# Patient Record
Sex: FEMALE | Race: NATIVE HAWAIIAN OR OTHER PACIFIC ISLANDER
[De-identification: names, ages, dates, MRNs, and addresses within clinical notes are randomized per-mention and may not be internally consistent; named-entity substitution may affect disease eponyms.]

---

## 2018-10-21 ENCOUNTER — HOSPITAL ENCOUNTER (EMERGENCY)
Dept: HOSPITAL 31 - C.ER | Age: 63
Discharge: HOME | End: 2018-10-21
Payer: COMMERCIAL

## 2018-10-21 VITALS
RESPIRATION RATE: 19 BRPM | OXYGEN SATURATION: 96 % | HEART RATE: 84 BPM | DIASTOLIC BLOOD PRESSURE: 98 MMHG | SYSTOLIC BLOOD PRESSURE: 170 MMHG

## 2018-10-21 VITALS — TEMPERATURE: 97.9 F

## 2018-10-21 VITALS — BODY MASS INDEX: 28 KG/M2

## 2018-10-21 DIAGNOSIS — N39.0: ICD-10-CM

## 2018-10-21 DIAGNOSIS — I10: Primary | ICD-10-CM

## 2018-10-21 LAB
BACTERIA #/AREA URNS HPF: (no result) /[HPF]
BILIRUB UR-MCNC: NEGATIVE MG/DL
GLUCOSE UR STRIP-MCNC: NORMAL MG/DL
LEUKOCYTE ESTERASE UR-ACNC: (no result) LEU/UL
PH UR STRIP: 8 [PH] (ref 5–8)
PROT UR STRIP-MCNC: (no result) MG/DL
RBC # UR STRIP: (no result) /UL
SP GR UR STRIP: 1.01 (ref 1–1.03)
UROBILINOGEN UR-MCNC: NORMAL MG/DL (ref 0.2–1)
WBC CLUMPS # UR AUTO: (no result) /HPF

## 2018-10-21 NOTE — C.PDOC
History Of Present Illness





63 years old male presents to ED for complaints of increased urinary frequency 

and dysuria that began 3 days ago. Denies nausea, vomiting, fever, vaginal 

bleeding or discharge. Patient states taking over the counter pethidine with 

mild relief. 


Chief Complaint (Nursing): Female Genitourinary


History Per: Patient


History/Exam Limitations: no limitations


Onset/Duration Of Symptoms: Days (3)


Current Symptoms Are (Timing): Still Present


Recent travel outside of the [place]: No





Past Medical History


Reviewed: Historical Data, Nursing Documentation, Vital Signs


Vital Signs: 





                                Last Vital Signs











Temp  97.9 F   10/21/18 10:50


 


Pulse  84   10/21/18 11:54


 


Resp  19   10/21/18 11:54


 


BP  170/98 H  10/21/18 11:54


 


Pulse Ox  96   10/21/18 11:54














- Medical History


PMH: HTN


Family History: States: No Known Family Hx





- Social History


Hx Alcohol Use: No


Hx Substance Use: No





- Immunization History


Hx Tetanus Toxoid Vaccination: No


Hx Influenza Vaccination: No


Hx Pneumococcal Vaccination: No





Review Of Systems


Constitutional: Negative for: Fever, Chills


Gastrointestinal: Negative for: Nausea, Vomiting, Abdominal Pain, Diarrhea


Genitourinary: Positive for: Dysuria, Frequency.  Negative for: Vaginal 

Discharge, Vaginal Bleeding


Skin: Negative for: Rash


Neurological: Negative for: Weakness, Numbness





Physical Exam





- Physical Exam


Appears: Well, Non-toxic, No Acute Distress


Skin: Normal Color, Warm, Dry, No Rash


Head: Atraumatic, Normacephalic


Eye(s): bilateral: Normal Inspection, PERRL, EOMI


Oral Mucosa: Moist


Neck: Supple


Chest: Symmetrical, No Tenderness


Cardiovascular: Rhythm Regular, No Murmur


Respiratory: Normal Breath Sounds, No Decreased Breath Sounds, No Rales, No 

Rhonchi, No Wheezing


Gastrointestinal/Abdominal: Normal Exam, Bowel Sounds (Active ), Soft, No 

Tenderness, No Distention, No Guarding, No Rebound


Extremity: Bilateral: Atraumatic, Normal Color And Temperature, Normal ROM


Pulses: Left Radial: Normal, Right Radial: Normal


Neurological/Psych: Oriented x3, Normal Speech


Gait: Steady





ED Course And Treatment


O2 Sat by Pulse Oximetry: 96 (RA)


Pulse Ox Interpretation: Normal





Medical Decision Making


Medical Decision Making: 





Plan:


* Urine Culture 


* Urinalysis 





Disposition





- Disposition


Referrals: 


[facility] Franki Req, [Non-Staff] - 


Disposition: HOME/ ROUTINE


Disposition Time: 11:35


Condition: GOOD


Additional Instructions: 





MARCELLO PATEL, thank you for letting us take care of you today. Your provider 

was Jomar Self DO and you were treated for URINARY PAIN. The emergency 

medical care you received today was directed at your acute symptoms. If you were

prescribed any medication, please fill it and take as directed. It may take 

several days for your symptoms to resolve. Return to the Emergency Department if

your symptoms worsen, do not improve, or if you have any other problems.





Please contact your doctor or call one of the physicians/clinics you have been 

referred to that are listed on the Patient Visit Information form that is 

included in your discharge packet. Bring any paperwork you were given at 

discharge with you along with any medications you are taking to your follow up 

visit. Our treatment cannot replace ongoing medical care by a primary care 

provider outside of the emergency department.





Thank you for allowing the [facility] team to be part of your care today.











You had a urine culture: It will take several days for the results, if any 

change in treatment is needed we will contact you.***








TAKE YOU BLOOD PRESSURE MEDICATION WHEN INSTRUCTED.











Follow up with your primary care doctor in 4-5 days for re-evaluation and 

further management.


Prescriptions: 


Nitrofurantoin Macrocrystals [Macrobid] 100 mg PO BID #14 cap


Instructions:  High Blood Pressure in Adults, Urinary Tract Infection, Adult 

(DC)


Forms:  [facility] (English)





- Clinical Impression


Clinical Impression: 


 Hypertension, UTI (urinary tract infection)








- Scribe Statement


The provider has reviewed the documentation as recorded by the Nenaiblane Quinonez





All medical record entries made by the Scribe were at my direction and 

personally dictated by me. I have reviewed the chart and agree that the record 

accurately reflects my personal performance of the history, physical exam, 

medical decision making, and the department course for this patient. I have also

 personally directed, reviewed, and agree with the discharge instructions and 

disposition.

## 2018-10-21 NOTE — C.PDOC
Chief Complaint (Nursing): Female Genitourinary





Past Medical History


Vital Signs: 





                                Last Vital Signs











Temp  97.9 F   10/21/18 10:50


 


Pulse  84   10/21/18 11:54


 


Resp  19   10/21/18 11:54


 


BP  170/98 H  10/21/18 11:54


 


Pulse Ox  96   10/21/18 11:54














- Medical History


PMH: HTN





- Social History


Hx Alcohol Use: No


Hx Substance Use: No





- Immunization History


Hx Tetanus Toxoid Vaccination: No


Hx Influenza Vaccination: No


Hx Pneumococcal Vaccination: No





ED Course And Treatment


O2 Sat by Pulse Oximetry: 96





Disposition





- Disposition


Referrals: 


[facility] Franki Munson, [Non-Staff] - 


Disposition: HOME/ ROUTINE


Additional Instructions: 





MARCELLO PATEL, thank you for letting us take care of you today. Your provider 

was Jomar Self DO and you were treated for URINARY PAIN. The emergency 

medical care you received today was directed at your acute symptoms. If you were

prescribed any medication, please fill it and take as directed. It may take 

several days for your symptoms to resolve. Return to the Emergency Department if

your symptoms worsen, do not improve, or if you have any other problems.





Please contact your doctor or call one of the physicians/clinics you have been 

referred to that are listed on the Patient Visit Information form that is 

included in your discharge packet. Bring any paperwork you were given at 

discharge with you along with any medications you are taking to your follow up 

visit. Our treatment cannot replace ongoing medical care by a primary care 

provider outside of the emergency department.





Thank you for allowing the [facility] team to be part of your care today.











You had a urine culture: It will take several days for the results, if any 

change in treatment is needed we will contact you.***








TAKE YOU BLOOD PRESSURE MEDICATION WHEN INSTRUCTED.











Follow up with your primary care doctor in 4-5 days for re-evaluation and 

further management.


Prescriptions: 


Nitrofurantoin Macrocrystals [Macrobid] 100 mg PO BID #14 cap


Instructions:  High Blood Pressure in Adults, Urinary Tract Infection, Adult 

(DC)


Forms:  [facility] (English)